# Patient Record
Sex: FEMALE | URBAN - NONMETROPOLITAN AREA
[De-identification: names, ages, dates, MRNs, and addresses within clinical notes are randomized per-mention and may not be internally consistent; named-entity substitution may affect disease eponyms.]

---

## 2022-05-05 ENCOUNTER — OFFICE VISIT (OUTPATIENT)
Dept: OPTOMETRY | Age: 42
End: 2022-05-05
Payer: COMMERCIAL

## 2022-05-05 DIAGNOSIS — S05.02XA CONJUNCTIVAL ABRASION, LEFT, INITIAL ENCOUNTER: Primary | ICD-10-CM

## 2022-05-05 PROCEDURE — 99212 OFFICE O/P EST SF 10 MIN: CPT

## 2022-05-05 PROCEDURE — 99214 OFFICE O/P EST MOD 30 MIN: CPT | Performed by: OPTOMETRIST

## 2022-05-05 PROCEDURE — G8427 DOCREV CUR MEDS BY ELIG CLIN: HCPCS | Performed by: OPTOMETRIST

## 2022-05-05 PROCEDURE — G8421 BMI NOT CALCULATED: HCPCS | Performed by: OPTOMETRIST

## 2022-05-05 PROCEDURE — 4004F PT TOBACCO SCREEN RCVD TLK: CPT | Performed by: OPTOMETRIST

## 2022-05-05 ASSESSMENT — VISUAL ACUITY
OD_SC: 20/25
OS_SC+: +3
METHOD: SNELLEN - LINEAR
OD_SC: 20/60
OS_SC: 20/25
OS_SC: 20/80

## 2022-05-05 ASSESSMENT — TONOMETRY
IOP_METHOD: NON-CONTACT AIR PUFF
OS_IOP_MMHG: 13
OD_IOP_MMHG: 14

## 2022-05-05 ASSESSMENT — SLIT LAMP EXAM - LIDS: COMMENTS: CLEAR

## 2022-05-05 NOTE — PROGRESS NOTES
Abbie Client presents today for   Chief Complaint   Patient presents with    Eye Injury   . HPI     Eye Injury     Laterality: left eye    Type of trauma: unknown    Associated signs and symptoms: Comments: (eye pain: 3 days )              Comments     5/3/21 Tori BUTLER  Scrathed eye OS ; in the left eye   Patient is taking gentamycin- 1 drop 4 hour for 7 days  Patient was walking the dog and her contact some how fall out of her eye. Earlier that day she was gardening and felt like something was in her eye. Patient is in need of contacts. No current outpatient medications on file. No current facility-administered medications for this visit. History reviewed. No pertinent family history. Social History     Socioeconomic History    Marital status: Unknown     Spouse name: None    Number of children: None    Years of education: None    Highest education level: None   Occupational History    None   Tobacco Use    Smoking status: None    Smokeless tobacco: None   Substance and Sexual Activity    Alcohol use: None    Drug use: None    Sexual activity: None   Other Topics Concern    None   Social History Narrative    None     Social Determinants of Health     Financial Resource Strain:     Difficulty of Paying Living Expenses: Not on file   Food Insecurity:     Worried About Running Out of Food in the Last Year: Not on file    Brooke of Food in the Last Year: Not on file   Transportation Needs:     Lack of Transportation (Medical): Not on file    Lack of Transportation (Non-Medical):  Not on file   Physical Activity:     Days of Exercise per Week: Not on file    Minutes of Exercise per Session: Not on file   Stress:     Feeling of Stress : Not on file   Social Connections:     Frequency of Communication with Friends and Family: Not on file    Frequency of Social Gatherings with Friends and Family: Not on file    Attends Christianity Services: Not on file   [facility] Active Member of Clubs or Organizations: Not on file    Attends Club or Organization Meetings: Not on file    Marital Status: Not on file   Intimate Partner Violence:     Fear of Current or Ex-Partner: Not on file    Emotionally Abused: Not on file    Physically Abused: Not on file    Sexually Abused: Not on file   Housing Stability:     Unable to Pay for Housing in the Last Year: Not on file    Number of [place] in the Last Year: Not on file    Unstable Housing in the Last Year: Not on file     History reviewed. No pertinent past medical history. Main Ophthalmology Exam     External Exam       Right Left    External  Normal          Slit Lamp Exam       Right Left    Lids/Lashes  clear     Conjunctiva/Sclera  conjunctival abrasion drawn     Cornea  clear     Anterior Chamber  Deep and quiet             <div id=\"MAIN_EXAM_REVIEWED\"></div>      Tonometry     Tonometry (Non-contact air puff, 3:32 PM)       Right Left    Pressure 14 13   IOPg:  15.5             15.4  CH:  12.5          13  WS: 9.3          8.6               Not recorded       Not recorded         Visual Acuity (Snellen - Linear)       Right Left    Dist sc 20/60 20/80 +3    Near sc 20/25 20/25          Not recorded       Not recorded       Not recorded           Not recorded       Not recorded              No orders of the defined types were placed in this encounter. IMPRESSION:  1. Conjunctival abrasion, left, initial encounter    d/c contact lenses     PLAN:    1. Use gentamycin drop 3x per day for one more week;        Patient Instructions   Use gent 3x per day in the left eye      Return in about 1 week (around 5/12/2022) for conj abrasion left eye .

## 2022-05-12 ENCOUNTER — OFFICE VISIT (OUTPATIENT)
Dept: OPTOMETRY | Age: 42
End: 2022-05-12
Payer: COMMERCIAL

## 2022-05-12 DIAGNOSIS — S05.02XD CONJUNCTIVAL ABRASION, LEFT, SUBSEQUENT ENCOUNTER: Primary | ICD-10-CM

## 2022-05-12 PROCEDURE — 4004F PT TOBACCO SCREEN RCVD TLK: CPT | Performed by: OPTOMETRIST

## 2022-05-12 PROCEDURE — 99211 OFF/OP EST MAY X REQ PHY/QHP: CPT

## 2022-05-12 PROCEDURE — G8421 BMI NOT CALCULATED: HCPCS | Performed by: OPTOMETRIST

## 2022-05-12 PROCEDURE — G8427 DOCREV CUR MEDS BY ELIG CLIN: HCPCS | Performed by: OPTOMETRIST

## 2022-05-12 PROCEDURE — 99213 OFFICE O/P EST LOW 20 MIN: CPT | Performed by: OPTOMETRIST

## 2022-05-12 RX ORDER — INSULIN LISPRO 100 [IU]/ML
INJECTION, SOLUTION INTRAVENOUS; SUBCUTANEOUS
COMMUNITY
Start: 2016-06-16 | End: 2023-02-08

## 2022-05-12 RX ORDER — GENTAMICIN SULFATE 3 MG/ML
SOLUTION/ DROPS OPHTHALMIC
COMMUNITY
Start: 2022-05-03

## 2022-05-12 ASSESSMENT — VISUAL ACUITY
CORRECTION_TYPE: CONTACTS
OS_CC: 20/20
OD_CC: 20/20 OU
OD_PH_CC: 20/20 OU
METHOD: SNELLEN - LINEAR

## 2022-05-12 ASSESSMENT — ENCOUNTER SYMPTOMS
ALLERGIC/IMMUNOLOGIC NEGATIVE: 0
EYES NEGATIVE: 0
RESPIRATORY NEGATIVE: 0
GASTROINTESTINAL NEGATIVE: 0

## 2022-05-12 ASSESSMENT — TONOMETRY
IOP_METHOD: NON-CONTACT AIR PUFF
OD_IOP_MMHG: 17
OS_IOP_MMHG: 18

## 2022-05-12 ASSESSMENT — SLIT LAMP EXAM - LIDS: COMMENTS: NORMAL

## 2022-05-12 NOTE — PATIENT INSTRUCTIONS
Gave -3.00 and -3.25 air optix hydraglyde (previously in air optix n & D )   Set up vision exam for earliest convenience

## 2022-05-12 NOTE — PROGRESS NOTES
Sanjuana Ortega presents today for   Chief Complaint   Patient presents with    Eye Pain   . HPI     Eye Pain       In left eye. Comments     Patient is here for Recheck for Adilia Chambersds 17. Abrasion OS. Still using the Gentamycin three times daily. Today will be her last day using it. Current Outpatient Medications   Medication Sig Dispense Refill    insulin lispro (HUMALOG) 100 UNIT/ML SOLN injection vial Use with insulin pump as directed, max 40 units a day      gentamicin (GARAMYCIN) 0.3 % ophthalmic solution PLACE 1 DROP INTO THE LEFT EYE EVERY 4 HOURS FOR 7 DAYS       No current facility-administered medications for this visit. History reviewed. No pertinent family history. Social History     Socioeconomic History    Marital status: Unknown     Spouse name: None    Number of children: None    Years of education: None    Highest education level: None   Occupational History    None   Tobacco Use    Smoking status: None    Smokeless tobacco: None   Substance and Sexual Activity    Alcohol use: None    Drug use: None    Sexual activity: None   Other Topics Concern    None   Social History Narrative    None     Social Determinants of Health     Financial Resource Strain:     Difficulty of Paying Living Expenses: Not on file   Food Insecurity:     Worried About Running Out of Food in the Last Year: Not on file    Brooke of Food in the Last Year: Not on file   Transportation Needs:     Lack of Transportation (Medical): Not on file    Lack of Transportation (Non-Medical):  Not on file   Physical Activity:     Days of Exercise per Week: Not on file    Minutes of Exercise per Session: Not on file   Stress:     Feeling of Stress : Not on file   Social Connections:     Frequency of Communication with Friends and Family: Not on file    Frequency of Social Gatherings with Friends and Family: Not on file    Attends Scientologist Services: Not on file   [employer] of Clubs or Organizations: Not on file    Attends Club or Organization Meetings: Not on file    Marital Status: Not on file   Intimate Partner Violence:     Fear of Current or Ex-Partner: Not on file    Emotionally Abused: Not on file    Physically Abused: Not on file    Sexually Abused: Not on file   Housing Stability:     Unable to Pay for Housing in the Last Year: Not on file    Number of [place] in the Last Year: Not on file    Unstable Housing in the Last Year: Not on file     History reviewed. No pertinent past medical history. ROS     Negative for: Constitutional, Gastrointestinal, Neurological, Skin, Genitourinary, Musculoskeletal, HENT, Endocrine, Cardiovascular, Eyes, Respiratory, Psychiatric, Allergic/Imm, Heme/Lymph          Main Ophthalmology Exam     External Exam       Right Left    External  Normal          Slit Lamp Exam       Right Left    Lids/Lashes  Normal    Conjunctiva/Sclera  mild trace injectin;  95% resolved              <div id=\"MAIN_EXAM_REVIEWED\"></div>      Tonometry     Tonometry (Non-contact air puff, 3:45 PM)       Right Left    Pressure 17 18   IOP.0             19.4  CH:  12.0          12.0  WS: 6.8       5.9               Not recorded       Not recorded         Visual Acuity (Snellen - Linear)       Right Left    Dist cc 20/40 20/20    Dist ph cc 20/20 OU     Near cc 20/20 OU     Correction: Contacts          Pupils     Pupils       Pupils    Right PERRL    Left PERRL              Neuro/Psych     Neuro/Psych     Oriented x3: Yes    Mood/Affect: Normal              Not recorded           Not recorded       Not recorded              No orders of the defined types were placed in this encounter. IMPRESSION:  1. Conjunctival abrasion, left, subsequent encounter        PLAN:    1. Discontinue drops;   Return if needed or any problems arise       Patient Instructions   Gave -3.00 and -3.25 air optix hydraglyde (previously in air optix n & D )   Set up vision exam for earliest convenience      Return if symptoms worsen or fail to improve.

## 2022-06-21 ENCOUNTER — OFFICE VISIT (OUTPATIENT)
Dept: OPTOMETRY | Age: 42
End: 2022-06-21
Payer: COMMERCIAL

## 2022-06-21 DIAGNOSIS — H52.203 MYOPIA OF BOTH EYES WITH ASTIGMATISM: ICD-10-CM

## 2022-06-21 DIAGNOSIS — H52.13 MYOPIA OF BOTH EYES WITH ASTIGMATISM: ICD-10-CM

## 2022-06-21 PROCEDURE — 92014 COMPRE OPH EXAM EST PT 1/>: CPT | Performed by: OPTOMETRIST

## 2022-06-21 PROCEDURE — 92015 DETERMINE REFRACTIVE STATE: CPT | Performed by: OPTOMETRIST

## 2022-06-21 ASSESSMENT — ENCOUNTER SYMPTOMS
EYES NEGATIVE: 0
ALLERGIC/IMMUNOLOGIC NEGATIVE: 0
RESPIRATORY NEGATIVE: 0
GASTROINTESTINAL NEGATIVE: 0

## 2022-06-21 ASSESSMENT — KERATOMETRY
OD_K2POWER_DIOPTERS: 45.00
OS_K1POWER_DIOPTERS: 44.00
OD_AXISANGLE_DEGREES: 164
OS_AXISANGLE2_DEGREES: 087
METHOD_AUTO_MANUAL: AUTOMATED
OD_K1POWER_DIOPTERS: 44.00
OS_K2POWER_DIOPTERS: 44.50
OS_AXISANGLE_DEGREES: 177
OD_AXISANGLE2_DEGREES: 074

## 2022-06-21 ASSESSMENT — TONOMETRY
IOP_METHOD: NON-CONTACT AIR PUFF
OD_IOP_MMHG: 19
OS_IOP_MMHG: 20

## 2022-06-21 ASSESSMENT — REFRACTION_MANIFEST
OD_AXIS: 075
OS_SPHERE: -2.00
OD_CYLINDER: -0.50
OD_SPHERE: -2.00
OS_CYLINDER: -0.50
OS_AXIS: 080

## 2022-06-21 ASSESSMENT — REFRACTION_CURRENTRX
OS_SPHERE: -3.25
OS_BRAND: CIBA: AIR OPTIX
OD_SPHERE: -3.00
OD_BRAND: CIBA: AIR OPTIX

## 2022-06-21 ASSESSMENT — VISUAL ACUITY
OS_CC: 20/15
CORRECTION_TYPE: CONTACTS
METHOD: SNELLEN - LINEAR

## 2022-06-21 ASSESSMENT — REFRACTION_WEARINGRX: OD_SPHERE: NOT WITH

## 2022-06-21 NOTE — PROGRESS NOTES
Aloma Files presents today for   Chief Complaint   Patient presents with    Blurred Vision    Vision Exam   .    HPI     Blurred Vision     In both eyes. Vision is blurred. Context:  distance vision. Comments     Last Vision Exam: 5/12/2022 Abrasion recheck / 2021 CE  Last Ophthalmology Exam: n/a  Last Filled Glasses Rx: Insurance: [place]  Update: Contacts ; Glasses if needed  Distance is getting more blurry  Diabetic:  Sugars: 236 currently  HmgA1C:  6.9  5/2022  Previously air optix to last but would like to go back to air optix night and day                   ROS     Negative for: Constitutional, Gastrointestinal, Neurological, Skin, Genitourinary, Musculoskeletal, HENT, Endocrine, Cardiovascular, Eyes, Respiratory, Psychiatric, Allergic/Imm, Heme/Lymph          History reviewed. No pertinent family history. Social History     Socioeconomic History    Marital status: Unknown     Spouse name: None    Number of children: None    Years of education: None    Highest education level: None   Occupational History    None   Tobacco Use    Smoking status: Never Smoker    Smokeless tobacco: Never Used   Substance and Sexual Activity    Alcohol use: None    Drug use: None    Sexual activity: None   Other Topics Concern    None   Social History Narrative    None     Social Determinants of Health     Financial Resource Strain:     Difficulty of Paying Living Expenses: Not on file   Food Insecurity:     Worried About Running Out of Food in the Last Year: Not on file    Brooke of Food in the Last Year: Not on file   Transportation Needs:     Lack of Transportation (Medical): Not on file    Lack of Transportation (Non-Medical):  Not on file   Physical Activity:     Days of Exercise per Week: Not on file    Minutes of Exercise per Session: Not on file   Stress:     Feeling of Stress : Not on file   Social Connections:     Frequency of Communication with Friends and Family: Not on file  Frequency of Social Gatherings with Friends and Family: Not on file    Attends Anglican Services: Not on file    Active Member of Clubs or Organizations: Not on file    Attends Club or Organization Meetings: Not on file    Marital Status: Not on file   Intimate Partner Violence:     Fear of Current or Ex-Partner: Not on file    Emotionally Abused: Not on file    Physically Abused: Not on file    Sexually Abused: Not on file   Housing Stability:     Unable to Pay for Housing in the Last Year: Not on file    Number of [place] in the Last Year: Not on file    Unstable Housing in the Last Year: Not on file     History reviewed. No pertinent past medical history.     Neuro/Psych     Neuro/Psych     Oriented x3: Yes    Mood/Affect: Normal                Main Ophthalmology Exam     External Exam       Right Left    External Normal Normal             <div id=\"MAIN_EXAM_REVIEWED\"></div>     Tonometry     Tonometry (Non-contact air puff, 11:14 AM)       Right Left    Pressure 19 20   IOP.8             20.8  CH:  11.5          11.2  WS: 8.6          8.0                     Visual Acuity (Snellen - Linear)       Right Left    Dist cc 20/20 20/15    Correction: Contacts        Keratometry     Keratometry (Automated)       K1 Axis K2 Axis    Right 44.00 074 45.00 164    Left 44.00 087 44.50 177                Ophthalmology Exam     Wearing Rx       Sphere    Right not with     Left                 Manifest Refraction     Manifest Refraction       Sphere Cylinder Rienzi Dist [place]    Right -2.00 -0.50 075 20/20    Left -2.00 -0.50 080 20/20          Manifest Refraction #2 (Auto)       Sphere Cylinder Rienzi Dist [place]    Right -1.00 -1.25 081     Left -1.25 -1.00 081                  Final Rx       Sphere Cylinder Rienzi Dist [place]    Right -2.00 -0.50 075 20/20    Left -2.00 -0.50 080 20/20           Contact Lens Current Rx     Current Contact Lens Rx       Brand Base Curve Diameter Sphere    Right [employer]: Air Optix -3.00    Left [employer]: Air Optix   -3.25          Current Contact Lens Rx #2       Brand Base Curve Diameter Sphere    Right [employer]: Air Optix N and Day  8.6 13.8 -2.25    Left [employer]: Air Optix Night and Day  8.6 13.8 -2.25                Final   Final Contact Lens Rx       Brand Base Curve Diameter Sphere    Right [employer]: Air Optix Night and DAy  8.6 13.8 -2.25    Left [employer]: Air Optix Night and Day  8.6 13.8 -2.25    Expiration Date: 6/22/2023    Replacement: Monthly           IMPRESSION:  1. Myopia of both eyes with astigmatism        PLAN:    1. New contact lens trials given;  (air optix, not n and day) we will recheck rx because of change and complete diabetic eye exam at that time       There are no Patient Instructions on file for this visit. Return in about 2 weeks (around 7/5/2022) for cl check (rx check) and diabetic dilation .

## 2022-07-19 ENCOUNTER — OFFICE VISIT (OUTPATIENT)
Dept: OPTOMETRY | Age: 42
End: 2022-07-19
Payer: COMMERCIAL

## 2022-07-19 DIAGNOSIS — H52.203 MYOPIA OF BOTH EYES WITH ASTIGMATISM: Primary | ICD-10-CM

## 2022-07-19 DIAGNOSIS — E10.69 TYPE 1 DIABETES MELLITUS WITH OTHER SPECIFIED COMPLICATION (HCC): ICD-10-CM

## 2022-07-19 DIAGNOSIS — H52.13 MYOPIA OF BOTH EYES WITH ASTIGMATISM: Primary | ICD-10-CM

## 2022-07-19 PROCEDURE — 1036F TOBACCO NON-USER: CPT | Performed by: OPTOMETRIST

## 2022-07-19 PROCEDURE — 99211 OFF/OP EST MAY X REQ PHY/QHP: CPT | Performed by: OPTOMETRIST

## 2022-07-19 PROCEDURE — 92250 FUNDUS PHOTOGRAPHY W/I&R: CPT | Performed by: OPTOMETRIST

## 2022-07-19 PROCEDURE — G8421 BMI NOT CALCULATED: HCPCS | Performed by: OPTOMETRIST

## 2022-07-19 PROCEDURE — 99214 OFFICE O/P EST MOD 30 MIN: CPT | Performed by: OPTOMETRIST

## 2022-07-19 PROCEDURE — 2022F DILAT RTA XM EVC RTNOPTHY: CPT | Performed by: OPTOMETRIST

## 2022-07-19 PROCEDURE — G8427 DOCREV CUR MEDS BY ELIG CLIN: HCPCS | Performed by: OPTOMETRIST

## 2022-07-19 PROCEDURE — 3046F HEMOGLOBIN A1C LEVEL >9.0%: CPT | Performed by: OPTOMETRIST

## 2022-07-19 RX ORDER — TROPICAMIDE 10 MG/ML
1 SOLUTION/ DROPS OPHTHALMIC ONCE
Status: COMPLETED | OUTPATIENT
Start: 2022-07-19 | End: 2022-07-19

## 2022-07-19 RX ADMIN — TROPICAMIDE 1 DROP: 10 SOLUTION/ DROPS OPHTHALMIC at 10:28

## 2022-07-19 ASSESSMENT — REFRACTION_CURRENTRX
OD_BASECURVE: 8.6
OD_SPHERE: -2.25
OS_SPHERE: -2.25
OS_DIAMETER: 13.8
OS_BASECURVE: 8.6
OD_DIAMETER: 13.8

## 2022-07-19 ASSESSMENT — REFRACTION_WEARINGRX
OS_SPHERE: -2.00
OD_SPHERE: -2.00
OD_AXIS: 075
OS_CYLINDER: -0.50
OS_AXIS: 080
OD_CYLINDER: -0.50

## 2022-07-19 ASSESSMENT — VISUAL ACUITY
METHOD: SNELLEN - LINEAR
CORRECTION_TYPE: CONTACTS
OS_CC: 20/20
OD_CC+: -1

## 2022-07-19 NOTE — PROGRESS NOTES
Timothy Marrero presents today for   Chief Complaint   Patient presents with    Follow-up   . HPI    Follow up CL CK and diabetic portion - dilate   Doing well with contacts, she does sleep in the lenses most nights but will rinse them before bed. She normally orders night & day but we just didn't have trials of those. History reviewed. No pertinent family history. Social History     Socioeconomic History    Marital status: Unknown     Spouse name: None    Number of children: None    Years of education: None    Highest education level: None   Tobacco Use    Smoking status: Never    Smokeless tobacco: Never     History reviewed. No pertinent past medical history. Neuro/Psych       Neuro/Psych       Oriented x3: Yes    Mood/Affect: Normal                    Not recorded       Not recorded           Visual Acuity (Snellen - Linear)         Right Left    Dist cc 20/25 -1 20/20      Correction: Contacts          Not recorded         Ophthalmology Exam       Wearing Rx         Sphere Cylinder Axis    Right -2.00 -0.50 075    Left -2.00 -0.50 080                    Not recorded               Contact Lens Current Rx       Current Contact Lens Rx         Brand Base Curve Diameter Sphere    Right [employer]: Air Optix Night and DAy  8.6 13.8 -2.25    Left [employer]: Air Optix Night and Day  8.6 13.8 -2.25                    Final   Final Contact Lens Rx         Brand Base Curve Diameter Sphere    Right [employer]: Air Optix Night and DAy  8.6 13.8 -2.25    Left [employer]: Air Optix Night and Day  8.6 13.8 -2.25      Expiration Date: 7/20/2023    Replacement: Monthly    Wearing Schedule: Daily wear             IMPRESSION:  1. Myopia of both eyes with astigmatism    2. Type 1 diabetes mellitus with other specified complication [facility])        PLAN:    Discussed the patient's diagnosis of diabetes and the impact this can have on their ocular health, potentially even leading to permanent blindness.   I discussed with the patient the importance of continued follow-up and management with their primary care physician to control their glycemic, blood pressure, and lipid levels. The patient verbalized understanding. New contact lenses will be ordered (2 boxes) and we will apply insurance if available       There are no Patient Instructions on file for this visit.    Return in about 1 year (around 7/19/2023) for complete eye exam.